# Patient Record
(demographics unavailable — no encounter records)

---

## 2025-01-31 NOTE — PHYSICAL EXAM
[Normal] : soft, non-tender, non-distended, no masses palpated, no HSM and normal bowel sounds [de-identified] : right turbinate congested

## 2025-01-31 NOTE — HEALTH RISK ASSESSMENT
[Very Good] : ~his/her~  mood as very good [Yes] : Yes [Never (0 pts)] : Never (0 points) [Never] : Never [NO] : No [None] : None [Employed] : employed [College] : College [Feels Safe at Home] : Feels safe at home [Fully functional (bathing, dressing, toileting, transferring, walking, feeding)] : Fully functional (bathing, dressing, toileting, transferring, walking, feeding) [Fully functional (using the telephone, shopping, preparing meals, housekeeping, doing laundry, using] : Fully functional and needs no help or supervision to perform IADLs (using the telephone, shopping, preparing meals, housekeeping, doing laundry, using transportation, managing medications and managing finances) [Smoke Detector] : smoke detector [Carbon Monoxide Detector] : carbon monoxide detector [Seat Belt] :  uses seat belt [Patient reported colonoscopy was normal] : Patient reported colonoscopy was normal [Monthly or less (1 pt)] : Monthly or less (1 point) [No falls in past year] : Patient reported no falls in the past year [0] : 2) Feeling down, depressed, or hopeless: Not at all (0) [PHQ-2 Negative - No further assessment needed] : PHQ-2 Negative - No further assessment needed [[id number]] : 0 [Change in mental status noted] : No change in mental status noted [Language] : denies difficulty with language [Behavior] : denies difficulty with behavior [Learning/Retaining New Information] : denies difficulty learning/retaining new information [Handling Complex Tasks] : denies difficulty handling complex tasks [Reasoning] : denies difficulty with reasoning [Spatial Ability and Orientation] : denies difficulty with spatial ability and orientation [Reports changes in hearing] : Reports no changes in hearing [Reports changes in vision] : Reports no changes in vision [Reports changes in dental health] : Reports no changes in dental health [MammogramDate] : 1/25 [ColonoscopyDate] : 1/17

## 2025-01-31 NOTE — HISTORY OF PRESENT ILLNESS
[FreeTextEntry1] : Annual [de-identified] : Annual decline flu mammo colon 8 years ago Dr Griffin  spots on body poor sleep Eats well exercise not happy with weight is a size 6 feel well no meds

## 2025-01-31 NOTE — PLAN
[FreeTextEntry1] : Annual Decline flu shot Mammo 1 month ago colon 8 years ago Dr Griffin  EKG WNl Routine labs flonase prn nasal congestion skin spot qngioma  reassurance

## 2025-03-31 NOTE — REASON FOR VISIT
[Follow-Up: _____] : a [unfilled] follow-up visit [FreeTextEntry1] : The patient is a 59-year-old female who presents today for Botox injections.

## 2025-04-03 NOTE — END OF VISIT
[FreeTextEntry3] : This note was written by Usama Quintana on 04/03/2025 acting solely as a scribe for Dr. Aneesh Arango.   All medical record entries made by the Scribe were at my, Dr. Aneesh Arango, direction and personally dictated by me on 04/03/2025. I have personally reviewed the chart and agree that the record accurately reflects my personal performance of the history, physical exam, assessment and plan.

## 2025-04-03 NOTE — DISCUSSION/SUMMARY
[FreeTextEntry1] :  She has findings consistent with a probable occult left elbow radial head or neck fracture after a fall yesterday.  There is no obvious fracture noted on radiographs.  I had a discussion with the patient regarding today's visit, the prognosis of this diagnosis, and treatment recommendations and options. At this time, I recommended wearing the sling for comfort as well as icing and gentle mobility exercises.. I also recommended she begin taking a course of Celebrex 200 BID as needed. She will follow-up in 2 weeks.  The patient has agreed to the above plan of management and has expressed full understanding.  All questions were fully answered to the patient's satisfaction.  My cumulative time spent on this visit included: Preparation for the visit, review of the medical records, review of pertinent diagnostic studies, examination and counseling of the patient on the above diagnosis, treatment plan and prognosis, orders of diagnostic tests, medication and/or appropriate procedures and documentation in the medical records of today's visit.

## 2025-04-03 NOTE — PHYSICAL EXAM
[de-identified] : - Constitutional: This is a female in no obvious distress.   - Psych: Patient is alert and oriented to person, place and time.  Patient has a normal mood and affect.  - Cardiovascular: Normal pulses throughout the upper extremities.  No significant varicosities are noted in the upper extremities.   ---   Examination of her left elbow demonstrates mild swelling laterally.  She is tender laterally over the radial head and neck.  She also has milder tenderness medially along the medial collateral ligament.  She has limitation of flexion and extension and pronation supination.  She has pain with pronation supination.  There is no instability.  There are no bony blocks to motion.  There is no tenderness distally at the wrist.  She is neurovascularly intact distally.          [de-identified] : I reviewed x-rays of the left elbow and forearm dated 4/2/2025 which demonstrated no obvious fractures, dislocations, or arthritis.

## 2025-04-03 NOTE — ADDENDUM
[FreeTextEntry1] :  I, Usama Quintana, acted solely as a scribe for Dr. Arango on this date on 04/03/2025.

## 2025-04-03 NOTE — PHYSICAL EXAM
[de-identified] : - Constitutional: This is a female in no obvious distress.   - Psych: Patient is alert and oriented to person, place and time.  Patient has a normal mood and affect.  - Cardiovascular: Normal pulses throughout the upper extremities.  No significant varicosities are noted in the upper extremities.   ---   Examination of her left elbow demonstrates mild swelling laterally.  She is tender laterally over the radial head and neck.  She also has milder tenderness medially along the medial collateral ligament.  She has limitation of flexion and extension and pronation supination.  She has pain with pronation supination.  There is no instability.  There are no bony blocks to motion.  There is no tenderness distally at the wrist.  She is neurovascularly intact distally.          [de-identified] : AP, lateral oblique radiographs of the left elbow demonstrate

## 2025-04-03 NOTE — HISTORY OF PRESENT ILLNESS
[FreeTextEntry1] : 15 days status post left elbow injury after a fall resulting in probable occult radial head or neck fracture  See note from when she was seen the office 2 weeks ago.  I recommended a sling for comfort and gentle range of motion exercises  She is

## 2025-04-03 NOTE — HISTORY OF PRESENT ILLNESS
[Right] : right hand dominant [FreeTextEntry1] : She comes in today for evaluation of left elbow injury as a result of a fall 1 day ago. She went to an urgent care where she was imaged and placed in a sling. She reports swelling, stiffness, numbness in her forearm and radiating pain into her shoulder. She rates her pain as an 8/10.

## 2025-04-17 NOTE — END OF VISIT
[FreeTextEntry3] : This note was written by Usama Quintana on 04/17/2025 acting solely as a scribe for Dr. Aneesh Arango.   All medical record entries made by the Scribe were at my, Dr. Aneesh Arango, direction and personally dictated by me on 04/17/2025. I have personally reviewed the chart and agree that the record accurately reflects my personal performance of the history, physical exam, assessment and plan.

## 2025-04-17 NOTE — PHYSICAL EXAM
[de-identified] : - Constitutional: This is a female in no obvious distress.   - Psych: Patient is alert and oriented to person, place and time.  Patient has a normal mood and affect.  - Cardiovascular: Normal pulses throughout the upper extremities.  No significant varicosities are noted in the upper extremities.   ---   Examination of her left elbow demonstrates no obvious swelling laterally.  There is possibly mild tenderness along the lateral joint line.  She is more tender today in the region of the antecubital fossa and along the distal biceps tendon.  Her distal biceps tendon appears palpable but this is difficult to definitively determine.  She does have pain and weakness to resisted forearm supination.  There is no medial swelling or tenderness.  She has improved flexion and extension of the limitation of terminal flexion and extension.  There is no tenderness distally at the wrist, distal radius or TFCC ligament.  She remains neurovascularly intact distally. [de-identified] : AP, lateral, and radial head radiographs of the left elbow demonstrate no definitive fracture of the radial head noted.

## 2025-04-17 NOTE — HISTORY OF PRESENT ILLNESS
[FreeTextEntry1] : 15 days status post left elbow injury after a fall resulting in probable occult radial head or neck fracture  See note from when she was seen the office 2 weeks ago.  I recommended a sling for comfort and gentle range of motion exercises  She reports numbness, tingling, throbbing, and a "pulling" sensation throughout the hand. She rates her pain as a 3/10.

## 2025-04-17 NOTE — DISCUSSION/SUMMARY
[FreeTextEntry1] : I had a discussion regarding today's visit, the diagnosis and treatment recommendations and options.  We also discussed changes since the last visit.    I did tell her that I am not certain that she sustained an occult radial head or neck fracture and it is possible that she may have sustained a distal biceps tendon injury or other such injury.  Therefore, at this time, I recommended an MRI to evaluate the left elbow.  I have ordered this stat.  She will follow up after her MRI to review the results and discuss treatment recommendations.  The patient has agreed to the above plan of management and has expressed full understanding.  All questions were fully answered to the patient's satisfaction.  My cumulative time spent on today's visit was greater than 30 minutes and included: Preparation for the visit, review of the medical records, review of pertinent diagnostic studies, examination and counseling of the patient on the above diagnosis, treatment plan and prognosis, orders of diagnostic tests, medications and/or appropriate procedures and documentation in the medical records of today's visit.

## 2025-04-17 NOTE — ADDENDUM
[FreeTextEntry1] :  I, Usama Quintana, acted solely as a scribe for Dr. Arango on this date on 04/17/2025.